# Patient Record
Sex: FEMALE | Race: WHITE | HISPANIC OR LATINO | URBAN - METROPOLITAN AREA
[De-identification: names, ages, dates, MRNs, and addresses within clinical notes are randomized per-mention and may not be internally consistent; named-entity substitution may affect disease eponyms.]

---

## 2021-07-14 ENCOUNTER — OFFICE VISIT (OUTPATIENT)
Dept: OBGYN CLINIC | Facility: CLINIC | Age: 21
End: 2021-07-14

## 2021-07-14 VITALS
HEIGHT: 62 IN | DIASTOLIC BLOOD PRESSURE: 68 MMHG | HEART RATE: 75 BPM | SYSTOLIC BLOOD PRESSURE: 131 MMHG | BODY MASS INDEX: 22.45 KG/M2 | WEIGHT: 122 LBS

## 2021-07-14 DIAGNOSIS — Z72.51 HIGH RISK SEXUAL BEHAVIOR, UNSPECIFIED TYPE: ICD-10-CM

## 2021-07-14 DIAGNOSIS — Z01.419 ENCOUNTER FOR GYNECOLOGICAL EXAMINATION: ICD-10-CM

## 2021-07-14 DIAGNOSIS — Z23 NEED FOR VACCINATION: Primary | ICD-10-CM

## 2021-07-14 PROCEDURE — 87491 CHLMYD TRACH DNA AMP PROBE: CPT | Performed by: OBSTETRICS & GYNECOLOGY

## 2021-07-14 PROCEDURE — G0124 SCREEN C/V THIN LAYER BY MD: HCPCS | Performed by: PATHOLOGY

## 2021-07-14 PROCEDURE — 99385 PREV VISIT NEW AGE 18-39: CPT | Performed by: STUDENT IN AN ORGANIZED HEALTH CARE EDUCATION/TRAINING PROGRAM

## 2021-07-14 PROCEDURE — G0145 SCR C/V CYTO,THINLAYER,RESCR: HCPCS | Performed by: PATHOLOGY

## 2021-07-14 PROCEDURE — 90651 9VHPV VACCINE 2/3 DOSE IM: CPT

## 2021-07-14 PROCEDURE — 87591 N.GONORRHOEAE DNA AMP PROB: CPT | Performed by: OBSTETRICS & GYNECOLOGY

## 2021-07-14 PROCEDURE — 90471 IMMUNIZATION ADMIN: CPT

## 2021-07-14 NOTE — PROGRESS NOTES
Subjective      Priscila Jean is a 23 y o  Allayne Sara, with 1 prior  (2019) female who presents for annual well woman exam   Never had a pap smear before  Periods are regular every 28-30 days, lasting 4 days with moderate flow  No intermenstrual bleeding, spotting, or discharge  She is interested in learning about birth control today, and has no complaints otherwise      Menstrual History:  OB History    No obstetric history on file  Menarche age: 4 or 13  Patient's last menstrual period was 2021  Sexually active: 1 male partner  Current contraception: none  History of abnormal Pap smear: n/a  Family history of breast, endometrial, uterine or ovarian cancer: no  Regular self breast exam: {yes/no:63}  History of abnormal mammogram: {yes***/no:17057}  History of abnormal lipids: {yes***/no:35694}  Gardasil vaccine: No      Review of Systems  Review of Systems     Objective   OBGyn Exam            Assessment     Priscila Jean is a 23 y o  No obstetric history on file  with {NORMAL/ABNORMAL WWBK:72526} gynecologic exam      Plan       1  Routine well woman exam done today  2   Pap and HPV:Pap with HPV {DONE/NOT DONE:7722097406::"was not"} done today  Current ASCCP Guidelines reviewed  3   The patient {EFO Refused/Accepted:77450} STD testing  {Diagnoses; stds:43413} testing performed  Safe sex practices have been discussed  4  The patient is not sexually active  Contraceptive options were reviewed, including hormonal methods, both combination (pill, patch, vaginal ring) and progesterone-only (pill, Depo Provera and Nexplanon), intrauterine devices (Mirena, Amber and Paraguard), barrier methods (condoms, diaphragm) and male/female sterilization  The mechanisms, risks, benefits and side effects of all methods were discussed  All questions have been answered to her satisfaction       5  The following were reviewed in today's visit: {Gyn counselin}          6  Patient to return to office in {Numbers; 1-12 months:46105} for ***  D/w Dr Ric Hood [place] [address], [facility] Gynecology PGY-2  7/14/2021  3:07 PM

## 2021-07-14 NOTE — PROGRESS NOTES
Subjective      Irwin Bain is a 23 y o  [facility] prior  (2019) female who presents for annual well woman exam   Never had a pap smear before  Periods are regular every 28-30 days, lasting 4 days with moderate flow  No intermenstrual bleeding, spotting, or discharge  She is interested in learning about birth control today, and has no complaints otherwise      Menstrual History:  OB History        1    Para   1    Term   1       0    AB   0    Living   1       SAB   0    TAB   0    Ectopic   0    Multiple        Live Births                    Menarche age: 4 or 13, she is unsure  Patient's last menstrual period was 2021  Sexually active: 1 male partner  Current contraception: none  History of abnormal Pap smear: n/a  Family history of breast, endometrial, uterine or ovarian cancer: no  Regular self breast exam: No  History of abnormal mammogram:   Gardasil vaccine: No, and she is interested today    Review of Systems  Review of Systems   All other systems reviewed and are negative  Objective   Physical Exam  Constitutional:       Appearance: Normal appearance  Genitourinary:      Pelvic exam was performed with patient supine  Vulva normal       No posterior fourchette lesion present  No urethral pain, tenderness or mass present  Genitourinary Comments: Cervical ectropion present, dilated to 0 5 cm visually   HENT:      Head: Normocephalic and atraumatic  Cardiovascular:      Pulses: Normal pulses  Pulmonary:      Effort: No respiratory distress  Breath sounds: Normal breath sounds  Abdominal:      General: Abdomen is flat  There is no distension  Palpations: Abdomen is soft  There is no mass  Tenderness: There is no guarding  Musculoskeletal:         General: Normal range of motion  Cervical back: Normal range of motion  Neurological:      Mental Status: She is alert and oriented to person, place, and time     Skin: General: Skin is warm and dry  Psychiatric:         Mood and Affect: Mood normal          Behavior: Behavior normal    Vitals reviewed  Exam conducted with a chaperone present  Min Olivares is a 23 y o   with a normal gynecologic exam      Plan     1  Routine well woman exam done today  2   Pap and HPV: Pap was collected today  Current ASCCP Guidelines reviewed  Both described and bobby pictures with assistance of [place] phone  of uterus, cervix, vagina and explained HPV, cervical cancer, and gardasil vaccine  She desires the vaccine today  3   The patient accepted STD testing  chlamydia, gonorrhea and HPV testing performed  Safe sex practices have been discussed  4  The patient is not sexually active  Contraceptive options were reviewed, including hormonal methods, both combination (pill, patch, vaginal ring) and progesterone-only (pill, Depo Provera and Nexplanon), intrauterine devices (Mirena, Amber and Paraguard), barrier methods (condoms, diaphragm) and male/female sterilization  The mechanisms, risks, benefits and side effects of all methods were discussed  All questions have been answered to her satisfaction  She is going to think about her options and return for another visit for further discussion/initiation  5  The following were reviewed in today's visit: STD testing, family planning choices, exercise and healthy diet  6  Patient to return to office in 1 year for annual well woman, and as needed if issues arise    D/w Dr Nguyễn Hood [address] Gynecology PGY-2  2021  10:09 PM

## 2021-07-16 LAB
C TRACH DNA SPEC QL NAA+PROBE: NEGATIVE
N GONORRHOEA DNA SPEC QL NAA+PROBE: NEGATIVE

## 2021-07-20 LAB
LAB AP GYN PRIMARY INTERPRETATION: ABNORMAL
Lab: ABNORMAL
PATH INTERP SPEC-IMP: ABNORMAL

## 2021-08-04 ENCOUNTER — TELEPHONE (OUTPATIENT)
Dept: OBGYN CLINIC | Facility: CLINIC | Age: 21
End: 2021-08-04

## 2021-08-04 NOTE — TELEPHONE ENCOUNTER
Called Tracy to discuss results, left  to call the clinic back  Pap smear resulted as ASCUS, called the lab with John Jesus MA to see if they can add on the reflex HPV  Will call patient with final results    Carleen Santiago , [facility] Gynecology PGY-2  8/4/2021  2:29 PM

## 2021-08-06 PROCEDURE — 87624 HPV HI-RISK TYP POOLED RSLT: CPT | Performed by: OBSTETRICS & GYNECOLOGY

## 2021-11-15 ENCOUNTER — TELEPHONE (OUTPATIENT)
Dept: OBGYN CLINIC | Facility: CLINIC | Age: 21
End: 2021-11-15